# Patient Record
Sex: MALE | Race: OTHER
[De-identification: names, ages, dates, MRNs, and addresses within clinical notes are randomized per-mention and may not be internally consistent; named-entity substitution may affect disease eponyms.]

---

## 2019-10-24 ENCOUNTER — HOSPITAL ENCOUNTER (EMERGENCY)
Dept: HOSPITAL 61 - ER | Age: 29
Discharge: HOME | End: 2019-10-24
Payer: COMMERCIAL

## 2019-10-24 VITALS — WEIGHT: 180 LBS | HEIGHT: 69 IN | BODY MASS INDEX: 26.66 KG/M2

## 2019-10-24 VITALS — SYSTOLIC BLOOD PRESSURE: 104 MMHG | DIASTOLIC BLOOD PRESSURE: 56 MMHG

## 2019-10-24 DIAGNOSIS — E16.2: ICD-10-CM

## 2019-10-24 DIAGNOSIS — K52.9: Primary | ICD-10-CM

## 2019-10-24 DIAGNOSIS — F19.10: ICD-10-CM

## 2019-10-24 LAB
ALBUMIN SERPL-MCNC: 4 G/DL (ref 3.4–5)
ALBUMIN/GLOB SERPL: 1 {RATIO} (ref 1–1.7)
ALP SERPL-CCNC: 80 U/L (ref 46–116)
ALT SERPL-CCNC: 19 U/L (ref 16–63)
AMPHETAMINE/METHAMPHETAMINE: (no result)
ANION GAP SERPL CALC-SCNC: 7 MMOL/L (ref 6–14)
APTT PPP: YELLOW S
AST SERPL-CCNC: 14 U/L (ref 15–37)
BACTERIA #/AREA URNS HPF: 0 /HPF
BARBITURATES UR-MCNC: (no result) UG/ML
BASOPHILS # BLD AUTO: 0 X10^3/UL (ref 0–0.2)
BASOPHILS NFR BLD: 1 % (ref 0–3)
BENZODIAZ UR-MCNC: (no result) UG/L
BILIRUB SERPL-MCNC: 1.5 MG/DL (ref 0.2–1)
BILIRUB UR QL STRIP: NEGATIVE
BUN SERPL-MCNC: 11 MG/DL (ref 8–26)
BUN/CREAT SERPL: 12 (ref 6–20)
CALCIUM SERPL-MCNC: 8.7 MG/DL (ref 8.5–10.1)
CANNABINOIDS UR-MCNC: (no result) UG/L
CHLORIDE SERPL-SCNC: 103 MMOL/L (ref 98–107)
CO2 SERPL-SCNC: 32 MMOL/L (ref 21–32)
COCAINE UR-MCNC: (no result) NG/ML
CREAT SERPL-MCNC: 0.9 MG/DL (ref 0.7–1.3)
EOSINOPHIL NFR BLD: 0.1 X10^3/UL (ref 0–0.7)
EOSINOPHIL NFR BLD: 3 % (ref 0–3)
ERYTHROCYTE [DISTWIDTH] IN BLOOD BY AUTOMATED COUNT: 13.4 % (ref 11.5–14.5)
FIBRINOGEN PPP-MCNC: CLEAR MG/DL
GFR SERPLBLD BASED ON 1.73 SQ M-ARVRAT: 101.2 ML/MIN
GLOBULIN SER-MCNC: 4 G/DL (ref 2.2–3.8)
GLUCOSE SERPL-MCNC: 66 MG/DL (ref 70–99)
HCT VFR BLD CALC: 47 % (ref 39–53)
HGB BLD-MCNC: 16.2 G/DL (ref 13–17.5)
LIPASE: 95 U/L (ref 73–393)
LYMPHOCYTES # BLD: 1.3 X10^3/UL (ref 1–4.8)
LYMPHOCYTES NFR BLD AUTO: 37 % (ref 24–48)
MCH RBC QN AUTO: 32 PG (ref 25–35)
MCHC RBC AUTO-ENTMCNC: 35 G/DL (ref 31–37)
MCV RBC AUTO: 92 FL (ref 79–100)
METHADONE SERPL-MCNC: (no result) NG/ML
MONO #: 0.5 X10^3/UL (ref 0–1.1)
MONOCYTES NFR BLD: 15 % (ref 0–9)
NEUT #: 1.6 X10^3/UL (ref 1.8–7.7)
NEUTROPHILS NFR BLD AUTO: 45 % (ref 31–73)
NITRITE UR QL STRIP: NEGATIVE
OPIATES UR-MCNC: (no result) NG/ML
PCP SERPL-MCNC: (no result) MG/DL
PH UR STRIP: 6 [PH]
PLATELET # BLD AUTO: 179 X10^3/UL (ref 140–400)
POTASSIUM SERPL-SCNC: 3.7 MMOL/L (ref 3.5–5.1)
PROT SERPL-MCNC: 8 G/DL (ref 6.4–8.2)
PROT UR STRIP-MCNC: NEGATIVE MG/DL
RBC # BLD AUTO: 5.12 X10^6/UL (ref 4.3–5.7)
RBC #/AREA URNS HPF: 0 /HPF (ref 0–2)
SODIUM SERPL-SCNC: 142 MMOL/L (ref 136–145)
SQUAMOUS #/AREA URNS LPF: (no result) /LPF
UROBILINOGEN UR-MCNC: 1 MG/DL
WBC # BLD AUTO: 3.6 X10^3/UL (ref 4–11)
WBC #/AREA URNS HPF: (no result) /HPF (ref 0–4)

## 2019-10-24 PROCEDURE — 99284 EMERGENCY DEPT VISIT MOD MDM: CPT

## 2019-10-24 PROCEDURE — 81001 URINALYSIS AUTO W/SCOPE: CPT

## 2019-10-24 PROCEDURE — 96374 THER/PROPH/DIAG INJ IV PUSH: CPT

## 2019-10-24 PROCEDURE — 80307 DRUG TEST PRSMV CHEM ANLYZR: CPT

## 2019-10-24 PROCEDURE — 82962 GLUCOSE BLOOD TEST: CPT

## 2019-10-24 PROCEDURE — 83690 ASSAY OF LIPASE: CPT

## 2019-10-24 PROCEDURE — 96361 HYDRATE IV INFUSION ADD-ON: CPT

## 2019-10-24 PROCEDURE — 36415 COLL VENOUS BLD VENIPUNCTURE: CPT

## 2019-10-24 PROCEDURE — 85025 COMPLETE CBC W/AUTO DIFF WBC: CPT

## 2019-10-24 PROCEDURE — 87086 URINE CULTURE/COLONY COUNT: CPT

## 2019-10-24 PROCEDURE — 80053 COMPREHEN METABOLIC PANEL: CPT

## 2019-10-24 NOTE — PHYS DOC
Past Medical History


Past Medical History:  No Pertinent History


Past Surgical History:  No Surgical History


Alcohol Use:  Occasionally


Drug Use:  Marijuana





Adult General


Chief Complaint


Chief Complaint:  ABDOMINAL PAIN





HPI


HPI





Patient is a 29  year old male who presents with complaining of nausea and 

vomiting and diarrhea. Patient states he did not have appetite for the last 4 

days and since yesterday has had episodes of nausea and vomiting and diarrhea. 

Patient states 4 episodes of vomiting yesterday and 6 episodes today and more 

than 2 episodes of nonbloody diarrhea every day with lower abdominal cramping 

pain during episodes of diarrhea with radiation to his back. Patient coming of 

subjective fever and chills since yesterday without checking his temperature. 

Patient complaining of decrease of urine output without sick contact, history of

the same problem, using drugs or alcohol. Patient denies abdominal pain at time 

of evaluation.





Review of Systems


Review of Systems





Constitutional: Reports subjective fever and chills


Eyes: Denies change in visual acuity, redness, or eye pain []


HENT: Denies nasal congestion or sore throat []


Respiratory: Denies cough or shortness of breath []


Cardiovascular: No additional information not addressed in HPI []


GI: Reports abdominal pain, nausea, vomiting, diarrhea []


: Denies dysuria or hematuria []


Musculoskeletal: Denies back pain or joint pain []


Integument: Denies rash or skin lesions []


Neurologic: Denies headache, focal weakness or sensory changes []


Endocrine: Denies polyuria or polydipsia []





All other systems were reviewed and found to be within normal limits, except as 

documented in this note.





Current Medications


Current Medications





Current Medications








 Medications


  (Trade)  Dose


 Ordered  Sig/[facility]  Start Time


 Stop Time Status Last Admin


Dose Admin


 


 Ondansetron HCl


  (Zofran)  4 mg  1X  ONCE  10/24/19 21:45


 10/24/19 21:46 DC 10/24/19 21:37


4 MG


 


 Sodium Chloride  1,000 ml @ 


 1,000 mls/hr  Q1H  10/24/19 21:30


 10/24/19 22:29 DC 10/24/19 21:36


1,000 MLS/HR











Allergies


Allergies





Allergies








Coded Allergies Type Severity Reaction Last Updated Verified


 


  No Known Drug Allergies    10/24/19 No











Physical Exam


Physical Exam








Constitutional: Well developed, well nourished, mild distress, non-toxic 

appearance, afebrile. []


HENT: Normocephalic, atraumatic.


Eyes: PERRLA, EOMI, conjunctiva normal, no discharge. [] 


Neck: Normal range of motion, no tenderness, supple, no stridor. [] 


Cardiovascular:Heart rate regular rhythm, no murmur []


Lungs & Thorax:  Bilateral breath sounds clear to auscultation []


Abdomen: Bowel sounds normal, soft, no tenderness, no masses, no pulsatile 

masses. [] 


Skin: Warm, dry, no erythema, no rash. [] 


Back: No tenderness, no CVA tenderness. [] 


Extremities: No tenderness, no cyanosis, no clubbing, ROM intact, no edema. [] 


Neurologic: Alert and oriented X 3, no focal deficits noted. []


Psychologic: Affect normal, judgement normal, mood normal. []





Current Patient Data


Vital Signs





                                   Vital Signs








  Date Time  Temp Pulse Resp B/P (MAP) Pulse Ox O2 Delivery O2 Flow Rate FiO2


 


10/24/19 22:44  68 20 104/56 (72) 96 Room Air  


 


10/24/19 21:05 98.0       





 98.0       








Lab Values





                                Laboratory Tests








Test


 10/24/19


21:23 10/24/19


23:02 10/24/19


23:24


 


White Blood Count


 3.6 x10^3/uL


(4.0-11.0)  L 


 





 


Red Blood Count


 5.12 x10^6/uL


(4.30-5.70) 


 





 


Hemoglobin


 16.2 g/dL


(13.0-17.5) 


 





 


Hematocrit


 47.0 %


(39.0-53.0) 


 





 


Mean Corpuscular Volume


 92 fL ()


 


 





 


Mean Corpuscular Hemoglobin 32 pg (25-35)    


 


Mean Corpuscular Hemoglobin


Concent 35 g/dL


(31-37) 


 





 


Red Cell Distribution Width


 13.4 %


(11.5-14.5) 


 





 


Platelet Count


 179 x10^3/uL


(140-400) 


 





 


Neutrophils (%) (Auto) 45 % (31-73)    


 


Lymphocytes (%) (Auto) 37 % (24-48)    


 


Monocytes (%) (Auto) 15 % (0-9)  H  


 


Eosinophils (%) (Auto) 3 % (0-3)    


 


Basophils (%) (Auto) 1 % (0-3)    


 


Neutrophils # (Auto)


 1.6 x10^3/uL


(1.8-7.7)  L 


 





 


Lymphocytes # (Auto)


 1.3 x10^3/uL


(1.0-4.8) 


 





 


Monocytes # (Auto)


 0.5 x10^3/uL


(0.0-1.1) 


 





 


Eosinophils # (Auto)


 0.1 x10^3/uL


(0.0-0.7) 


 





 


Basophils # (Auto)


 0.0 x10^3/uL


(0.0-0.2) 


 





 


Sodium Level


 142 mmol/L


(136-145) 


 





 


Potassium Level


 3.7 mmol/L


(3.5-5.1) 


 





 


Chloride Level


 103 mmol/L


() 


 





 


Carbon Dioxide Level


 32 mmol/L


(21-32) 


 





 


Anion Gap 7 (6-14)    


 


Blood Urea Nitrogen


 11 mg/dL


(8-26) 


 





 


Creatinine


 0.9 mg/dL


(0.7-1.3) 


 





 


Estimated GFR


(Cockcroft-Gault) 101.2  


 


 





 


BUN/Creatinine Ratio 12 (6-20)    


 


Glucose Level


 66 mg/dL


(70-99)  L 


 





 


Calcium Level


 8.7 mg/dL


(8.5-10.1) 


 





 


Total Bilirubin


 1.5 mg/dL


(0.2-1.0)  H 


 





 


Aspartate Amino Transferase


(AST) 14 U/L (15-37)


L 


 





 


Alanine Aminotransferase (ALT)


 19 U/L (16-63)


 


 





 


Alkaline Phosphatase


 80 U/L


() 


 





 


Total Protein


 8.0 g/dL


(6.4-8.2) 


 





 


Albumin


 4.0 g/dL


(3.4-5.0) 


 





 


Albumin/Globulin Ratio 1.0 (1.0-1.7)    


 


Lipase


 95 U/L


() 


 





 


Urine Collection Type  Unknown   


 


Urine Color  Yellow   


 


Urine Clarity  Clear   


 


Urine pH  6.0   


 


Urine Specific Gravity  1.015   


 


Urine Protein


 


 Negative mg/dL


(NEG-TRACE) 





 


Urine Glucose (UA)


 


 Negative mg/dL


(NEG) 





 


Urine Ketones (Stick)


 


 Negative mg/dL


(NEG) 





 


Urine Blood


 


 Negative (NEG)


 





 


Urine Nitrite


 


 Negative (NEG)


 





 


Urine Bilirubin


 


 Negative (NEG)


 





 


Urine Urobilinogen Dipstick


 


 1.0 mg/dL (0.2


mg/dL) 





 


Urine Leukocyte Esterase  Trace (NEG)   


 


Urine RBC  0 /HPF (0-2)   


 


Urine WBC


 


 1-4 /HPF (0-4)


 





 


Urine Squamous Epithelial


Cells 


 Few /LPF  


 





 


Urine Bacteria


 


 0 /HPF (0-FEW)


 





 


Urine Mucus  Mod /LPF   


 


Urine Opiates Screen  Neg (NEG)   


 


Urine Methadone Screen  Neg (NEG)   


 


Urine Barbiturates  Neg (NEG)   


 


Urine Phencyclidine Screen  Neg (NEG)   


 


Urine


Amphetamine/Methamphetamine 


 Pos (NEG)  


 





 


Urine Benzodiazepines Screen  Neg (NEG)   


 


Urine Cocaine Screen  Neg (NEG)   


 


Urine Cannabinoids Screen  Pos (NEG)   


 


Urine Ethyl Alcohol  Neg (NEG)   


 


Glucose (Fingerstick)


 


 


 102 mg/dL


(70-99)  H





                                Laboratory Tests


10/24/19 21:23








                                Laboratory Tests


10/24/19 21:23











EKG


EKG


[]





Radiology/Procedures


Radiology/Procedures


[]





Course & Med Decision Making


Course & Med Decision Making


Pertinent Labs  reviewed. (See chart for details)





Evaluation of patient in ER showed 29-year-old male patient complaining of 

nausea and vomiting and diarrhea and subjective fever. Patient had unremarkable 

physical exam and labs no sign of dehydration or electrolyte problem. Blood 

sugar was 66 and patient treated with juice with increase of blood sugar to 102.

 Patient tolerated oral intake without problem. Patient had positive urine for 

methamphetamine and marijuana. Patient was advised to take over-the-counter 

Imodium as needed for diarrhea. Patient did not have episodes of diarrhea while 

he was in ER.





I've spoken with the patient and/or caregivers. I've explained the patient's 

condition, diagnosis and treatment plan based on information available to me at 

this time. I've answered the patient's and/or caregivers questions and addressed

 any concerns. The patient and/or caregivers have a good understanding the 

patient's diagnosis, condition and treatment plan as can be expected at this 

point. Vital signs have been stabilized. The patient's condition is stable for 

discharge from the emergency department.





The patient will pursue further outpatient evaluation with her primary care 

provider or other designated consulting physician as outlined in the discharge 

instructions. Patient and/or caregivers are agreeable to this plan of care and 

follow-up instructions have been explained in detail. The patient and/or 

caregivers have received these instructions in written format and expressed 

understanding of these discharge instructions. The patient and her caregivers 

are aware that if any significant change in condition or worsening of symptoms 

should prompt him to immediately return to this of the closest emergency 

department.  If an emergent department is not readily available I would 

encourage him to call 911.





Boyd Disclaimer


Dragon Disclaimer


This electronic medical record was generated, in whole or in part, using a voice

 recognition dictation system.





Departure


Departure


Impression:  


   Primary Impression:  


   Acute gastroenteritis


   Additional Impressions:  


   Hypoglycemia


   Substance abuse


Disposition:  01 HOME, SELF-CARE (at 2334)


Condition:  IMPROVED


Referrals:  


NO PCP (PCP)


Patient Instructions:  Hypoglycemia (Low Blood Sugar), Viral Gastroenteritis





Additional Instructions:  


Drink plenty of liquids


Follow-up with your primary care physician in 3-5 days


Return to ER if not getting better


Take over-the-counter Imodium as needed for diarrhea


Do not eat solid food for the next 24 hours


Scripts


Ondansetron Hcl (ZOFRAN) 4 Mg Tablet


1 TAB PO PRN Q6-8HRS for nausea, #12 TAB


   Prov: ADRIANE LOW MD         10/24/19





Problem Qualifiers











ADRIANE LOW MD             Oct 24, 2019 21:31